# Patient Record
Sex: FEMALE | Race: WHITE | Employment: FULL TIME | URBAN - METROPOLITAN AREA
[De-identification: names, ages, dates, MRNs, and addresses within clinical notes are randomized per-mention and may not be internally consistent; named-entity substitution may affect disease eponyms.]

---
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
## 2018-01-18 ENCOUNTER — HOSPITAL ENCOUNTER (EMERGENCY)
Facility: HOSPITAL | Age: 18
Discharge: HOME | End: 2018-01-18
Payer: COMMERCIAL

## 2018-01-18 VITALS
WEIGHT: 135 LBS | BODY MASS INDEX: 24.84 KG/M2 | TEMPERATURE: 98 F | HEIGHT: 62 IN | OXYGEN SATURATION: 100 % | RESPIRATION RATE: 20 BRPM | SYSTOLIC BLOOD PRESSURE: 119 MMHG | HEART RATE: 89 BPM | DIASTOLIC BLOOD PRESSURE: 68 MMHG

## 2018-01-18 DIAGNOSIS — N92.0 MENORRHAGIA WITH REGULAR CYCLE: Primary | ICD-10-CM

## 2018-01-18 LAB — B-HCG UR QL: NEGATIVE

## 2018-01-18 PROCEDURE — 87591 N.GONORRHOEAE DNA AMP PROB: CPT | Performed by: NURSE PRACTITIONER

## 2018-01-18 PROCEDURE — 87491 CHLMYD TRACH DNA AMP PROBE: CPT | Performed by: NURSE PRACTITIONER

## 2018-01-18 PROCEDURE — 99284 EMERGENCY DEPT VISIT MOD MDM: CPT

## 2018-01-18 PROCEDURE — 87205 SMEAR GRAM STAIN: CPT | Performed by: NURSE PRACTITIONER

## 2018-01-18 PROCEDURE — 81025 URINE PREGNANCY TEST: CPT

## 2018-01-18 PROCEDURE — 87106 FUNGI IDENTIFICATION YEAST: CPT | Performed by: NURSE PRACTITIONER

## 2018-01-18 PROCEDURE — 87808 TRICHOMONAS ASSAY W/OPTIC: CPT | Performed by: NURSE PRACTITIONER

## 2018-01-18 NOTE — ED INITIAL ASSESSMENT (HPI)
Patient complains of vaginal bleeding, states she passed a clot the size of her fist, states she may be pregnant, denies pain, states she is \"super nauseous\"

## 2018-01-18 NOTE — ED PROVIDER NOTES
Patient Seen in: [facility] AND [facility] Emergency Department    History   Patient presents with:  Vaginal Bleeding    Stated Complaint: vaginal bleeding    Presents into the emergency room for evaluation.   Patient states her last menstrual cycle was in [place] [address]20]    Current:/68   Pulse 89   Temp 98.2 °F (36.8 °C) (Temporal)   Resp 20   Ht 157.5 cm (5' 2\")   Wt 61.2 kg   LMP 10/20/2017   SpO2 100%   BMI 24.69 kg/m²         Physical Exam   Constitutional: She is oriented to person, place, and time.  She a bleeding is just heavier menstrual cycle bleeding. On exam she has a moderate amount of vaginal bleeding however nothing extremely heavy. Urine pregnancy was negative.       Results for orders placed or performed during the hospital encounter of 01/18/18

## 2018-01-20 LAB — TRICHOMONAS SCREEN: NEGATIVE

## 2019-01-08 PROBLEM — F41.1 GAD (GENERALIZED ANXIETY DISORDER): Status: ACTIVE | Noted: 2019-01-08

## 2019-03-17 PROBLEM — F32.9 MAJOR DEPRESSIVE DISORDER: Status: ACTIVE | Noted: 2019-03-17

## 2019-03-22 ENCOUNTER — PATIENT OUTREACH (OUTPATIENT)
Dept: CASE MANAGEMENT | Age: 19
End: 2019-03-22

## 2019-03-22 NOTE — PROGRESS NOTES
Called patient cell phone [phone] and left voice mail asking her to call me for LINDSAY GASTELUM Call.

## 2024-07-17 NOTE — ED NOTES
Patient seen in clinic for warfarin management due to atrial fibrillation with an INR goal of 2.0-3.0.  Estimated duration of therapy is indefinite.     Patient states compliant all of the time with regimen.  No bleeding or thromboembolic side effects noted.  No significant med or dietary changes.  No significant recent illness or disease state changes.      PT/INR done in office per protocol.  INR is 2.0 which is therapeutic.     Warfarin regimen will be continued at current dose of 2.5mg Sunday, and 5mg all other days.  Will retest in 4 weeks.    Patient understands dosing directions and information discussed. Dosing schedule and follow up appointment given to patient.   Progress note routed to referring physicians office. Discussed with patient the Pharmacist Collaborative Practice Agreement.  Patient provided verbal and/or electronic (ex. [employer]) consent to participate in the collaborative practice agreement between the pharmacist and referred patient.     For Pharmacy Admin Tracking Only    Intervention Detail:   Total # of Interventions Recommended: 0  Total # of Interventions Accepted: 0  Time Spent (min): 15            Pt to ER with c/o heavy vaginal bleeding that started this am. Pt states she passed a large clot today. Pt c/o nausea. Pt denies pelvic pain/abdominal pain. Pt denies fever at home. LMP 10-20-17. Pt denies chest pain or dizziness.

## (undated) NOTE — ED AVS SNAPSHOT
[facility] Emergency Department    [address]     [place] [address]    Phone:  [phone]    Fax:  [address] [place] [address]   MRN: [id number]    Department:  [facility] Emergency Department   Date of Visit:  5/19 and Class Registration line at [phone] or find a doctor online by visiting [email or web address].    IF THERE IS ANY CHANGE OR WORSENING OF YOUR CONDITION, CALL YOUR PRIMARY CARE PHYSICIAN AT ONCE OR RETURN IMMEDIATELY TO [address] [place] [address].     If

## (undated) NOTE — ED AVS SNAPSHOT
[facility] Emergency Department    [address]     [place] [address]    Phone:  [phone]    Fax:  [address] [place] [address]   MRN: [id number]    Department:  [facility] Emergency Department   Date of Visit:  5/19 service to you, we would appreciate any positive or negative feedback related to the care you received in our emergency department. Please call our [address] [facility] [place] [facility] at [phone]. Your Emergency Department team is here to serve you.   You are our top priori I certified that I have received a copy of the aftercare instructions; that these instructions have been explained to me; all questions pertaining to these instructions have been answered in a satisfactory manner.         [address] information, go to [email or web address] and click on the Sign Up Now link in the [employer] box. Enter your [employer] Activation Code exactly as it appears below along with your Zip Code and Date of Birth to complete the sign-up process.  If you do

## (undated) NOTE — ED AVS SNAPSHOT
Diane Candelario   MRN: [id number]    Department:  [facility] Emergency Department   Date of Visit:  1/18/2018           Disclosure     Insurance plans vary and the physician(s) referred by the ER may not be covered by your plan.  Please contact CARE PHYSICIAN AT ONCE OR RETURN IMMEDIATELY TO THE EMERGENCY DEPARTMENT. If you have been prescribed any medication(s), please fill your prescription right away and begin taking the medication(s) as directed.   If you believe that any of the medications

## (undated) NOTE — LETTER
[address] [facility].   [address] [address]  Dept: [phone]  Dept Fax: [phone]: [phone]    Date: 05/19/2017       I have seen Jose Ana 7/17/1998 and found her medically cleared for incarcerat